# Patient Record
Sex: FEMALE | Race: WHITE | NOT HISPANIC OR LATINO | Employment: OTHER | URBAN - METROPOLITAN AREA
[De-identification: names, ages, dates, MRNs, and addresses within clinical notes are randomized per-mention and may not be internally consistent; named-entity substitution may affect disease eponyms.]

---

## 2017-06-09 ENCOUNTER — OFFICE VISIT (OUTPATIENT)
Dept: TRANSPLANT | Facility: CLINIC | Age: 82
End: 2017-06-09
Payer: MEDICARE

## 2017-06-09 ENCOUNTER — LAB VISIT (OUTPATIENT)
Dept: LAB | Facility: HOSPITAL | Age: 82
End: 2017-06-09
Attending: INTERNAL MEDICINE
Payer: MEDICARE

## 2017-06-09 VITALS
SYSTOLIC BLOOD PRESSURE: 117 MMHG | HEART RATE: 58 BPM | WEIGHT: 232.81 LBS | HEIGHT: 62 IN | BODY MASS INDEX: 42.84 KG/M2 | DIASTOLIC BLOOD PRESSURE: 50 MMHG

## 2017-06-09 DIAGNOSIS — I10 ESSENTIAL HYPERTENSION: ICD-10-CM

## 2017-06-09 DIAGNOSIS — G47.33 OSA ON CPAP: ICD-10-CM

## 2017-06-09 DIAGNOSIS — I50.32 CHRONIC DIASTOLIC CONGESTIVE HEART FAILURE: ICD-10-CM

## 2017-06-09 DIAGNOSIS — I27.20 PULMONARY HYPERTENSION: ICD-10-CM

## 2017-06-09 DIAGNOSIS — E78.2 MIXED HYPERLIPIDEMIA: ICD-10-CM

## 2017-06-09 DIAGNOSIS — I27.20 PULMONARY HYPERTENSION: Primary | ICD-10-CM

## 2017-06-09 LAB
ALBUMIN SERPL BCP-MCNC: 3.4 G/DL
ALP SERPL-CCNC: 68 U/L
ALT SERPL W/O P-5'-P-CCNC: 13 U/L
ANION GAP SERPL CALC-SCNC: 9 MMOL/L
AST SERPL-CCNC: 22 U/L
BILIRUB SERPL-MCNC: 0.8 MG/DL
BNP SERPL-MCNC: 68 PG/ML
BUN SERPL-MCNC: 19 MG/DL
CALCIUM SERPL-MCNC: 9.1 MG/DL
CHLORIDE SERPL-SCNC: 94 MMOL/L
CO2 SERPL-SCNC: 29 MMOL/L
CREAT SERPL-MCNC: 0.8 MG/DL
EST. GFR  (AFRICAN AMERICAN): >60 ML/MIN/1.73 M^2
EST. GFR  (NON AFRICAN AMERICAN): >60 ML/MIN/1.73 M^2
GLUCOSE SERPL-MCNC: 87 MG/DL
POTASSIUM SERPL-SCNC: 5 MMOL/L
PROT SERPL-MCNC: 7.1 G/DL
SODIUM SERPL-SCNC: 132 MMOL/L

## 2017-06-09 PROCEDURE — 99214 OFFICE O/P EST MOD 30 MIN: CPT | Mod: S$GLB,,, | Performed by: INTERNAL MEDICINE

## 2017-06-09 PROCEDURE — 99999 PR PBB SHADOW E&M-EST. PATIENT-LVL III: CPT | Mod: PBBFAC,,, | Performed by: INTERNAL MEDICINE

## 2017-06-09 PROCEDURE — 1159F MED LIST DOCD IN RCRD: CPT | Mod: S$GLB,,, | Performed by: INTERNAL MEDICINE

## 2017-06-09 PROCEDURE — 1125F AMNT PAIN NOTED PAIN PRSNT: CPT | Mod: S$GLB,,, | Performed by: INTERNAL MEDICINE

## 2017-06-09 RX ORDER — SPIRONOLACTONE 25 MG/1
25 TABLET ORAL DAILY
Qty: 90 TABLET | Refills: 3 | Status: SHIPPED | OUTPATIENT
Start: 2017-06-09 | End: 2018-06-29 | Stop reason: SDUPTHER

## 2017-06-09 RX ORDER — LISINOPRIL 10 MG/1
10 TABLET ORAL DAILY
Qty: 90 TABLET | Refills: 3 | Status: SHIPPED | OUTPATIENT
Start: 2017-06-09 | End: 2018-06-17 | Stop reason: SDUPTHER

## 2017-06-09 RX ORDER — TORSEMIDE 10 MG/1
10 TABLET ORAL DAILY
Qty: 90 TABLET | Refills: 3 | Status: SHIPPED | OUTPATIENT
Start: 2017-06-09 | End: 2018-06-29 | Stop reason: SDUPTHER

## 2017-06-09 RX ORDER — TRIAMCINOLONE ACETONIDE 1 MG/G
CREAM TOPICAL
COMMUNITY
Start: 2017-05-13 | End: 2017-09-11

## 2017-06-09 NOTE — PROGRESS NOTES
"Subjective:     HPI:  Very pleasant 80 y/o female with Hx of HTN, diastolic dysfunction, obesity and KHANH (not using CPAP) who is referred for evaluation and management of pulmonary HTN. She is accompanied by her daughter. Clinically reports NYHA/WHO  Class III symptoms. Most recent echo done earlier  this year showed preserved LV function with normal LV size, mild concentric hypertrophy, PASP of 75 mm of Hg. RHC done here at [facility] showed moderate to sever PH with high elevated PCWP consistent with group 2 PH. Labs are WNL.     RHC done in 11/2016  AOPRES: 152/70 (97)  FICKCI: 2.18  FICKCO: 4.43  PAPRES: 66/11 (36)  PVR: 4.06  PWPRES: 26/20 (26)  RAPRES: 14/13 (14)  RVPRES: 56/0, 12    Past Medical History:   Diagnosis Date    DVT (deep venous thrombosis)     Edema     Hyperlipidemia     Hypertension     KHANH (obstructive sleep apnea)      No past surgical history on file.  OB History     No data available        Review of Systems   Constitution: Negative. Negative for chills, decreased appetite, diaphoresis, fever, weakness, malaise/fatigue, night sweats, weight gain and weight loss.   Eyes: Negative.    Cardiovascular: Positive for dyspnea on exertion and orthopnea. Negative for chest pain, claudication, cyanosis, irregular heartbeat, leg swelling, near-syncope, palpitations, paroxysmal nocturnal dyspnea and syncope.   Respiratory: Negative for cough, hemoptysis and shortness of breath.    Endocrine: Negative.    Hematologic/Lymphatic: Negative.    Skin: Negative for color change, dry skin and nail changes.   Musculoskeletal: Negative.    Gastrointestinal: Negative.    Genitourinary: Negative.        Objective:   Blood pressure (!) 117/50, pulse (!) 58, height 5' 2" (1.575 m), weight 105.6 kg (232 lb 12.9 oz).body mass index is 42.58 kg/m².  Physical Exam   Constitutional: She is oriented to person, place, and time. Vital signs are normal. She appears well-developed and well-nourished.   HENT:   Head: " Normocephalic.   Eyes: Pupils are equal, round, and reactive to light.   Neck: Neck supple. No JVD present.   Cardiovascular: Normal rate, regular rhythm, normal heart sounds and intact distal pulses.  PMI is not displaced.  Exam reveals no gallop and no friction rub.    No murmur heard.      Pulmonary/Chest: Effort normal and breath sounds normal. She has no wheezes. She has no rales.   Abdominal: Soft. Bowel sounds are normal. She exhibits no distension. There is no tenderness. There is no rebound.   Musculoskeletal: She exhibits no edema.   Neurological: She is alert and oriented to person, place, and time.   Nursing note and vitals reviewed.      Labs:    Chemistry        Component Value Date/Time     12/12/2016 0750    K 4.3 12/12/2016 0750     12/12/2016 0750    CO2 29 12/12/2016 0750    BUN 22 12/12/2016 0750    CREATININE 0.7 12/12/2016 0750    GLU 92 12/12/2016 0750        Component Value Date/Time    CALCIUM 9.1 12/12/2016 0750    ALKPHOS 85 11/15/2016 0935    AST 24 11/15/2016 0935    ALT 12 11/15/2016 0935    BILITOT 0.7 11/15/2016 0935          Magnesium   Date Value Ref Range Status   11/04/2016 1.8 1.6 - 2.6 mg/dL Final     Lab Results   Component Value Date    WBC 5.78 11/15/2016    HGB 14.6 11/15/2016    HCT 45.5 11/15/2016     11/15/2016     Lab Results   Component Value Date    INR 1.0 11/08/2016     BNP   Date Value Ref Range Status   12/12/2016 75 0 - 99 pg/mL Final     Comment:     Values of less than 100 pg/ml are consistent with non-CHF populations.   11/04/2016 56 0 - 99 pg/mL Final     Comment:     Values of less than 100 pg/ml are consistent with non-CHF populations.     No results found for: LDH  No results found for this or any previous visit.  No results found for this or any previous visit.    Assessment:      1. Pulmonary hypertension    2. Essential hypertension    3. Chronic diastolic congestive heart failure    4. Mixed hyperlipidemia    5. KHANH on CPAP         Plan:   80 y/o female with above Hx and severe pulmonary HTN by 2D echo and RHC. G  roup 2 (HTN, diastolic dysfunction) and 3 (untreated KHANH and obesity hypoventilation syndrome) PH.   Increase Lisinopril to 10 mg daily  BMP next week  Start Torsemide 10 mg daily  She is currently taking Aldactone 25 mg BID. Am changing it to 25 mg daily.   Had a long discussion with the patient about the importance of using her CPAP and losing weight.  Recommend 2 gram sodium restriction and 1500cc fluid restriction.  Encourage physical activity with graded exercise program.  Requested patient to weigh themselves daily, and to notify us if their weight increases by more than 3 lbs in 1 day or 5 lbs in 1 week.    RTC in 3 months with labs      Rm Nguyễn MD

## 2017-09-11 ENCOUNTER — LAB VISIT (OUTPATIENT)
Dept: LAB | Facility: HOSPITAL | Age: 82
End: 2017-09-11
Attending: INTERNAL MEDICINE
Payer: MEDICARE

## 2017-09-11 ENCOUNTER — OFFICE VISIT (OUTPATIENT)
Dept: TRANSPLANT | Facility: CLINIC | Age: 82
End: 2017-09-11
Payer: MEDICARE

## 2017-09-11 VITALS
WEIGHT: 239 LBS | DIASTOLIC BLOOD PRESSURE: 48 MMHG | SYSTOLIC BLOOD PRESSURE: 132 MMHG | HEIGHT: 61 IN | HEART RATE: 57 BPM | BODY MASS INDEX: 45.12 KG/M2

## 2017-09-11 DIAGNOSIS — I50.32 CHRONIC DIASTOLIC CONGESTIVE HEART FAILURE: Primary | ICD-10-CM

## 2017-09-11 DIAGNOSIS — I27.20 PULMONARY HYPERTENSION: ICD-10-CM

## 2017-09-11 DIAGNOSIS — G47.33 OSA ON CPAP: ICD-10-CM

## 2017-09-11 DIAGNOSIS — E78.2 MIXED HYPERLIPIDEMIA: ICD-10-CM

## 2017-09-11 DIAGNOSIS — I10 ESSENTIAL HYPERTENSION: ICD-10-CM

## 2017-09-11 LAB
ALBUMIN SERPL BCP-MCNC: 3.1 G/DL
ALP SERPL-CCNC: 59 U/L
ALT SERPL W/O P-5'-P-CCNC: 8 U/L
ANION GAP SERPL CALC-SCNC: 7 MMOL/L
AST SERPL-CCNC: 16 U/L
BILIRUB SERPL-MCNC: 0.6 MG/DL
BNP SERPL-MCNC: 94 PG/ML
BUN SERPL-MCNC: 41 MG/DL
CALCIUM SERPL-MCNC: 9.3 MG/DL
CHLORIDE SERPL-SCNC: 101 MMOL/L
CO2 SERPL-SCNC: 30 MMOL/L
CREAT SERPL-MCNC: 1 MG/DL
EST. GFR  (AFRICAN AMERICAN): >60 ML/MIN/1.73 M^2
EST. GFR  (NON AFRICAN AMERICAN): 53.7 ML/MIN/1.73 M^2
GLUCOSE SERPL-MCNC: 90 MG/DL
POTASSIUM SERPL-SCNC: 4.5 MMOL/L
PROT SERPL-MCNC: 6.7 G/DL
SODIUM SERPL-SCNC: 138 MMOL/L

## 2017-09-11 PROCEDURE — 36415 COLL VENOUS BLD VENIPUNCTURE: CPT

## 2017-09-11 PROCEDURE — 99214 OFFICE O/P EST MOD 30 MIN: CPT | Mod: S$GLB,,, | Performed by: INTERNAL MEDICINE

## 2017-09-11 PROCEDURE — 1159F MED LIST DOCD IN RCRD: CPT | Mod: S$GLB,,, | Performed by: INTERNAL MEDICINE

## 2017-09-11 PROCEDURE — 3078F DIAST BP <80 MM HG: CPT | Mod: S$GLB,,, | Performed by: INTERNAL MEDICINE

## 2017-09-11 PROCEDURE — 99999 PR PBB SHADOW E&M-EST. PATIENT-LVL III: CPT | Mod: PBBFAC,,, | Performed by: INTERNAL MEDICINE

## 2017-09-11 PROCEDURE — 83880 ASSAY OF NATRIURETIC PEPTIDE: CPT

## 2017-09-11 PROCEDURE — 80053 COMPREHEN METABOLIC PANEL: CPT

## 2017-09-11 PROCEDURE — 3008F BODY MASS INDEX DOCD: CPT | Mod: S$GLB,,, | Performed by: INTERNAL MEDICINE

## 2017-09-11 PROCEDURE — 1126F AMNT PAIN NOTED NONE PRSNT: CPT | Mod: S$GLB,,, | Performed by: INTERNAL MEDICINE

## 2017-09-11 PROCEDURE — 3075F SYST BP GE 130 - 139MM HG: CPT | Mod: S$GLB,,, | Performed by: INTERNAL MEDICINE

## 2017-09-11 NOTE — PROGRESS NOTES
"Subjective:     HPI:  Very pleasant 82 y/o female with Hx of HTN, diastolic dysfunction, obesity and KHANH (not using CPAP) who is referred for evaluation and management of pulmonary HTN. She is accompanied by her daughter. Clinically reports NYHA/WHO  Class III symptoms. Most recent echo done earlier  this year showed preserved LV function with normal LV size, mild concentric hypertrophy, PASP of 75 mm of Hg. RHC done here at [facility] showed moderate to sever PH with high elevated PCWP consistent with group 2 PH. Labs are WNL.     Past Medical History:   Diagnosis Date    DVT (deep venous thrombosis)     Edema     Hyperlipidemia     Hypertension     KHANH (obstructive sleep apnea)      History reviewed. No pertinent surgical history.  OB History     No data available        Review of Systems   Constitution: Negative. Negative for chills, decreased appetite, diaphoresis, fever, weakness, malaise/fatigue, night sweats, weight gain and weight loss.   Eyes: Negative.    Cardiovascular: Positive for dyspnea on exertion and leg swelling. Negative for chest pain, claudication, cyanosis, irregular heartbeat, near-syncope, orthopnea, palpitations, paroxysmal nocturnal dyspnea and syncope.   Respiratory: Negative for cough, hemoptysis and shortness of breath.    Endocrine: Negative.    Hematologic/Lymphatic: Negative.    Skin: Negative for color change, dry skin and nail changes.   Musculoskeletal: Negative.    Gastrointestinal: Negative.    Genitourinary: Negative.        Objective:   Blood pressure (!) 132/48, pulse (!) 57, height 5' 1" (1.549 m), weight 108.4 kg (238 lb 15.7 oz).body mass index is 45.15 kg/m².  Physical Exam   Constitutional: She is oriented to person, place, and time. Vital signs are normal. She appears well-developed and well-nourished.   HENT:   Head: Normocephalic.   Eyes: Pupils are equal, round, and reactive to light.   Neck: Neck supple. No JVD present.   Cardiovascular: Normal rate, regular rhythm " and intact distal pulses.  PMI is not displaced.  Exam reveals no gallop and no friction rub.    Murmur heard.   Systolic murmur is present with a grade of 2/6  at the upper left sternal border  Pulmonary/Chest: Effort normal and breath sounds normal. No respiratory distress. She has no wheezes. She has no rales.   Abdominal: Soft. Bowel sounds are normal. She exhibits no distension. There is no tenderness. There is no rebound.   Musculoskeletal: She exhibits edema.   Neurological: She is alert and oriented to person, place, and time.   Nursing note and vitals reviewed.      Labs:    Chemistry        Component Value Date/Time     09/11/2017 0728    K 4.5 09/11/2017 0728     09/11/2017 0728    CO2 30 (H) 09/11/2017 0728    BUN 41 (H) 09/11/2017 0728    CREATININE 1.0 09/11/2017 0728    GLU 90 09/11/2017 0728        Component Value Date/Time    CALCIUM 9.3 09/11/2017 0728    ALKPHOS 59 09/11/2017 0728    AST 16 09/11/2017 0728    ALT 8 (L) 09/11/2017 0728    BILITOT 0.6 09/11/2017 0728    ESTGFRAFRICA >60.0 09/11/2017 0728    EGFRNONAA 53.7 (A) 09/11/2017 0728          Magnesium   Date Value Ref Range Status   11/04/2016 1.8 1.6 - 2.6 mg/dL Final     Lab Results   Component Value Date    WBC 5.78 11/15/2016    HGB 14.6 11/15/2016    HCT 45.5 11/15/2016     11/15/2016     Lab Results   Component Value Date    INR 1.0 11/08/2016     BNP   Date Value Ref Range Status   06/09/2017 68 0 - 99 pg/mL Final     Comment:     Values of less than 100 pg/ml are consistent with non-CHF populations.   12/12/2016 75 0 - 99 pg/mL Final     Comment:     Values of less than 100 pg/ml are consistent with non-CHF populations.   11/04/2016 56 0 - 99 pg/mL Final     Comment:     Values of less than 100 pg/ml are consistent with non-CHF populations.     No results found for: LDH  No results found for this or any previous visit.  No results found for this or any previous visit.    Assessment:      1. Chronic diastolic  congestive heart failure    2. Pulmonary hypertension    3. Essential hypertension    4. Mixed hyperlipidemia    5. KHANH on CPAP        Plan:   82 y/o female with above Hx and severe pulmonary HTN by 2D echo and RHC. Mixed picture of  WHO Group 2 (HTN, diastolic dysfunction) and 3 (untreated KHANH and obesity hypoventilation syndrome) PH.   Continue current Tx  Had a long discussion with the patient about the importance of using her CPAP and losing weight. Still does not want to use it (states it's too noisy).  Recommend 2 gram sodium restriction and 1500cc fluid restriction.  Encourage physical activity with graded exercise program.  Requested patient to weigh themselves daily, and to notify us if their weight increases by more than 3 lbs in 1 day or 5 lbs in 1 week.    RTC in 6 months with labs      Rm Nguyễn MD

## 2017-11-10 ENCOUNTER — OFFICE VISIT (OUTPATIENT)
Dept: URGENT CARE | Facility: CLINIC | Age: 82
End: 2017-11-10
Payer: MEDICARE

## 2017-11-10 VITALS
OXYGEN SATURATION: 95 % | HEART RATE: 69 BPM | DIASTOLIC BLOOD PRESSURE: 63 MMHG | WEIGHT: 238 LBS | BODY MASS INDEX: 44.93 KG/M2 | SYSTOLIC BLOOD PRESSURE: 137 MMHG | TEMPERATURE: 98 F | RESPIRATION RATE: 18 BRPM | HEIGHT: 61 IN

## 2017-11-10 DIAGNOSIS — J40 BRONCHITIS: Primary | ICD-10-CM

## 2017-11-10 PROCEDURE — 99214 OFFICE O/P EST MOD 30 MIN: CPT | Mod: S$GLB,,, | Performed by: NURSE PRACTITIONER

## 2017-11-10 RX ORDER — FLUTICASONE PROPIONATE 50 MCG
1 SPRAY, SUSPENSION (ML) NASAL 2 TIMES DAILY
Qty: 1 BOTTLE | Refills: 0 | Status: SHIPPED | OUTPATIENT
Start: 2017-11-10 | End: 2018-09-18

## 2017-11-10 RX ORDER — AZITHROMYCIN 250 MG/1
TABLET, FILM COATED ORAL
Qty: 6 TABLET | Refills: 0 | Status: SHIPPED | OUTPATIENT
Start: 2017-11-10 | End: 2017-11-15

## 2017-11-10 NOTE — PROGRESS NOTES
"Subjective:       Patient ID: Lily Hernandez is a 82 y.o. female.    Vitals:  height is 5' 1" (1.549 m) and weight is 108 kg (238 lb). Her temperature is 97.9 °F (36.6 °C). Her blood pressure is 137/63 and her pulse is 69. Her respiration is 18 and oxygen saturation is 95%.     Chief Complaint: Cough    Pt states she has a cough for about a week now, pt has wheezing and shortness of breath.  She has a chronic history of SOB associated with chronic cardiac and pulmonary.  She has not taken anything for current symptoms.  She denies known sick contacts.      Cough   This is a new problem. The current episode started in the past 7 days. The problem has been unchanged. The problem occurs every few minutes. The cough is productive of sputum. Associated symptoms include shortness of breath and wheezing. Pertinent negatives include no chest pain, chills, ear pain, eye redness, fever, headaches, myalgias or sore throat. Nothing aggravates the symptoms. She has tried OTC cough suppressant for the symptoms. The treatment provided no relief.     Review of Systems   Constitution: Negative for chills, fever and malaise/fatigue.   HENT: Negative for congestion, ear pain, hoarse voice and sore throat.    Eyes: Negative for discharge and redness.   Cardiovascular: Negative for chest pain, dyspnea on exertion and leg swelling.   Respiratory: Positive for cough, shortness of breath, sputum production and wheezing.    Musculoskeletal: Negative for myalgias.   Gastrointestinal: Negative for abdominal pain and nausea.   Neurological: Negative for headaches.       Objective:      Physical Exam   Constitutional: She is oriented to person, place, and time. She appears well-developed and well-nourished. She is cooperative.  Non-toxic appearance. She does not appear ill. No distress.   HENT:   Head: Normocephalic and atraumatic.   Right Ear: Hearing, tympanic membrane, external ear and ear canal normal.   Left Ear: Hearing, tympanic membrane, " external ear and ear canal normal.   Nose: Nose normal. No mucosal edema, rhinorrhea or nasal deformity. No epistaxis. Right sinus exhibits no maxillary sinus tenderness and no frontal sinus tenderness. Left sinus exhibits no maxillary sinus tenderness and no frontal sinus tenderness.   Mouth/Throat: Uvula is midline and mucous membranes are normal. No trismus in the jaw. Normal dentition. No uvula swelling. Posterior oropharyngeal erythema present.       Eyes: Conjunctivae and lids are normal. No scleral icterus.   Sclera clear bilat   Neck: Trachea normal, full passive range of motion without pain and phonation normal. Neck supple.   Cardiovascular: Normal rate, regular rhythm, intact distal pulses and normal pulses.    Murmur heard.   Systolic murmur is present with a grade of 3/6   Pulmonary/Chest: Effort normal and breath sounds normal. No respiratory distress. She has no decreased breath sounds. She has no wheezes. She has no rhonchi. She has no rales.   Abdominal: Soft. Normal appearance and bowel sounds are normal. She exhibits no distension. There is no tenderness.   Musculoskeletal: Normal range of motion. She exhibits no edema or deformity.   Neurological: She is alert and oriented to person, place, and time. She exhibits normal muscle tone. Coordination normal.   Skin: Skin is warm, dry and intact. She is not diaphoretic. No pallor.   Psychiatric: She has a normal mood and affect. Her speech is normal and behavior is normal. Judgment and thought content normal. Cognition and memory are normal.   Nursing note and vitals reviewed.      Assessment:       1. Bronchitis        Plan:         Bronchitis  -     azithromycin (Z-DEANN) 250 MG tablet; Take 2 tablets by mouth on day 1; Take 1 tablet by mouth on days 2-5  Dispense: 6 tablet; Refill: 0  -     fluticasone (FLONASE) 50 mcg/actuation nasal spray; 1 spray by Each Nare route 2 (two) times daily.  Dispense: 1 Bottle; Refill: 0      Patient Instructions        What Is Acute Bronchitis?  Acute bronchitis is when the airways in your lungs (bronchial tubes) become red and swollen (inflamed). It is usually caused by a viral infection. But it can also occur because of a bacteria or allergen. Symptoms include a cough that produces yellow or greenish mucus and can last for days or sometimes weeks.  Inside healthy lungs    Air travels in and out of the lungs through the airways. The linings of these airways produce sticky mucus. This mucus traps particles that enter the lungs. Tiny structures called cilia then sweep the particles out of the airways.     Healthy airway: Airways are normally open. Air moves in and out easily.      Healthy cilia: Tiny, hairlike cilia sweep mucus and particles up and out of the airways.   Lungs with bronchitis  Bronchitis often occurs with a cold or the flu virus. The airways become inflamed (red and swollen). There is a deep hacking cough from the extra mucus. Other symptoms may include:  · Wheezing  · Chest discomfort  · Shortness of breath  · Mild fever  A second infection, this time due to bacteria, may then occur. And airways irritated by allergens or smoke are more likely to get infected.        Inflamed airway: Inflammation and extra mucus narrow the airway, causing shortness of breath.      Impaired cilia: Extra mucus impairs cilia, causing congestion and wheezing. Smoking makes the problem worse.   Making a diagnosis  A physical exam, health history, and certain tests help your healthcare provider make the diagnosis.  Health history  Your healthcare provider will ask you about your symptoms.  The exam  Your provider listens to your chest for signs of congestion. He or she may also check your ears, nose, and throat.  Possible tests  · A sputum test for bacteria. This requires a sample of mucus from your lungs.  · A nasal or throat swab. This tests to see if you have a bacterial infection.  · A chest X-ray. This is done if your  healthcare provider thinks you have pneumonia.  · Tests to check for an underlying condition. Other tests may be done to check for things such as allergies, asthma, or COPD (chronic obstructive pulmonary disease). You may need to see a specialist for more lung function testing.  Treating a cough  The main treatment for bronchitis is easing symptoms. Avoiding smoke, allergens, and other things that trigger coughing can often help. If the infection is bacterial, you may be given antibiotics. During the illness, it's important to get plenty of sleep. To ease symptoms:  · Dont smoke. Also avoid secondhand smoke.  · Use a humidifier. Or try breathing in steam from a hot shower. This may help loosen mucus.  · Drink a lot of water and juice. They can soothe the throat and may help thin mucus.  · Sit up or use extra pillows when in bed. This helps to lessen coughing and congestion.  · Ask your provider about using medicine. Ask about using cough medicine, pain and fever medicine, or a decongestant.  Antibiotics  Most cases of bronchitis are caused by cold or flu viruses. They dont need antibiotics to treat them, even if your mucus is thick and green or yellow. Antibiotics dont treat viral illness and antibiotics have not been shown to have any benefit in cases of acute bronchitis. Taking antibiotics when they are not needed increases your risk of getting an infection later that is antibiotic-resistant. Antibiotics can also cause severe cases of diarrhea that require other antibiotics to treat.  It is important that you accept your healthcare provider's opinion to not use antibiotics. Your provider will prescribe antibiotics if the infection is caused by bacteria. If they are prescribed:  · Take all of the medicine. Take the medicine until it is used up, even if symptoms have improved. If you dont, the bronchitis may come back.  · Take the medicines as directed. For instance, some medicines should be taken with  food.  · Ask about side effects. Ask your provider or pharmacist what side effects are common, and what to do about them.  Follow-up care  You should see your provider again in 2 to 3 weeks. By this time, symptoms should have improved. An infection that lasts longer may mean you have a more serious problem.  Prevention  · Avoid tobacco smoke. If you smoke, quit. Stay away from smoky places. Ask friends and family not to smoke around you, or in your home or car.  · Get checked for allergies.  · Ask your provider about getting a yearly flu shot. Also ask about pneumococcal or pneumonia shots.  · Wash your hands often. This helps reduce the chance of picking up viruses that cause colds and flu.  Call your healthcare provider if:  · Symptoms worsen, or you have new symptoms  · Breathing problems worsen or  become severe  · Symptoms dont get better within a week, or within 3 days of taking antibiotics   Date Last Reviewed: 2/1/2017  © 8855-2848 [employer]. [address] [place] [address], [place] [address]. All rights reserved. This information is not intended as a substitute for professional medical care. Always follow your healthcare professional's instructions.

## 2017-11-11 NOTE — PATIENT INSTRUCTIONS
What Is Acute Bronchitis?  Acute bronchitis is when the airways in your lungs (bronchial tubes) become red and swollen (inflamed). It is usually caused by a viral infection. But it can also occur because of a bacteria or allergen. Symptoms include a cough that produces yellow or greenish mucus and can last for days or sometimes weeks.  Inside healthy lungs    Air travels in and out of the lungs through the airways. The linings of these airways produce sticky mucus. This mucus traps particles that enter the lungs. Tiny structures called cilia then sweep the particles out of the airways.     Healthy airway: Airways are normally open. Air moves in and out easily.      Healthy cilia: Tiny, hairlike cilia sweep mucus and particles up and out of the airways.   Lungs with bronchitis  Bronchitis often occurs with a cold or the flu virus. The airways become inflamed (red and swollen). There is a deep hacking cough from the extra mucus. Other symptoms may include:  · Wheezing  · Chest discomfort  · Shortness of breath  · Mild fever  A second infection, this time due to bacteria, may then occur. And airways irritated by allergens or smoke are more likely to get infected.        Inflamed airway: Inflammation and extra mucus narrow the airway, causing shortness of breath.      Impaired cilia: Extra mucus impairs cilia, causing congestion and wheezing. Smoking makes the problem worse.   Making a diagnosis  A physical exam, health history, and certain tests help your healthcare provider make the diagnosis.  Health history  Your healthcare provider will ask you about your symptoms.  The exam  Your provider listens to your chest for signs of congestion. He or she may also check your ears, nose, and throat.  Possible tests  · A sputum test for bacteria. This requires a sample of mucus from your lungs.  · A nasal or throat swab. This tests to see if you have a bacterial infection.  · A chest X-ray. This is done if your healthcare  provider thinks you have pneumonia.  · Tests to check for an underlying condition. Other tests may be done to check for things such as allergies, asthma, or COPD (chronic obstructive pulmonary disease). You may need to see a specialist for more lung function testing.  Treating a cough  The main treatment for bronchitis is easing symptoms. Avoiding smoke, allergens, and other things that trigger coughing can often help. If the infection is bacterial, you may be given antibiotics. During the illness, it's important to get plenty of sleep. To ease symptoms:  · Dont smoke. Also avoid secondhand smoke.  · Use a humidifier. Or try breathing in steam from a hot shower. This may help loosen mucus.  · Drink a lot of water and juice. They can soothe the throat and may help thin mucus.  · Sit up or use extra pillows when in bed. This helps to lessen coughing and congestion.  · Ask your provider about using medicine. Ask about using cough medicine, pain and fever medicine, or a decongestant.  Antibiotics  Most cases of bronchitis are caused by cold or flu viruses. They dont need antibiotics to treat them, even if your mucus is thick and green or yellow. Antibiotics dont treat viral illness and antibiotics have not been shown to have any benefit in cases of acute bronchitis. Taking antibiotics when they are not needed increases your risk of getting an infection later that is antibiotic-resistant. Antibiotics can also cause severe cases of diarrhea that require other antibiotics to treat.  It is important that you accept your healthcare provider's opinion to not use antibiotics. Your provider will prescribe antibiotics if the infection is caused by bacteria. If they are prescribed:  · Take all of the medicine. Take the medicine until it is used up, even if symptoms have improved. If you dont, the bronchitis may come back.  · Take the medicines as directed. For instance, some medicines should be taken with food.  · Ask about  side effects. Ask your provider or pharmacist what side effects are common, and what to do about them.  Follow-up care  You should see your provider again in 2 to 3 weeks. By this time, symptoms should have improved. An infection that lasts longer may mean you have a more serious problem.  Prevention  · Avoid tobacco smoke. If you smoke, quit. Stay away from smoky places. Ask friends and family not to smoke around you, or in your home or car.  · Get checked for allergies.  · Ask your provider about getting a yearly flu shot. Also ask about pneumococcal or pneumonia shots.  · Wash your hands often. This helps reduce the chance of picking up viruses that cause colds and flu.  Call your healthcare provider if:  · Symptoms worsen, or you have new symptoms  · Breathing problems worsen or  become severe  · Symptoms dont get better within a week, or within 3 days of taking antibiotics   Date Last Reviewed: 2/1/2017  © 0197-9705 [employer]. [address] [place] [address], [place] [address]. All rights reserved. This information is not intended as a substitute for professional medical care. Always follow your healthcare professional's instructions.

## 2018-03-06 ENCOUNTER — OFFICE VISIT (OUTPATIENT)
Dept: CARDIOLOGY | Facility: CLINIC | Age: 83
End: 2018-03-06
Payer: MEDICARE

## 2018-03-06 VITALS
SYSTOLIC BLOOD PRESSURE: 112 MMHG | HEART RATE: 67 BPM | DIASTOLIC BLOOD PRESSURE: 44 MMHG | WEIGHT: 239.38 LBS | HEIGHT: 61 IN | BODY MASS INDEX: 45.19 KG/M2

## 2018-03-06 DIAGNOSIS — I50.32 CHRONIC DIASTOLIC CONGESTIVE HEART FAILURE: ICD-10-CM

## 2018-03-06 DIAGNOSIS — I10 ESSENTIAL HYPERTENSION: ICD-10-CM

## 2018-03-06 DIAGNOSIS — E78.2 MIXED HYPERLIPIDEMIA: ICD-10-CM

## 2018-03-06 DIAGNOSIS — I25.10 CORONARY ARTERY DISEASE INVOLVING NATIVE CORONARY ARTERY OF NATIVE HEART WITHOUT ANGINA PECTORIS: Primary | ICD-10-CM

## 2018-03-06 DIAGNOSIS — I87.2 VENOUS STASIS DERMATITIS OF BOTH LOWER EXTREMITIES: ICD-10-CM

## 2018-03-06 DIAGNOSIS — I10 ESSENTIAL HYPERTENSION, BENIGN: ICD-10-CM

## 2018-03-06 DIAGNOSIS — I27.20 PULMONARY HYPERTENSION: ICD-10-CM

## 2018-03-06 DIAGNOSIS — I27.9 CHRONIC PULMONARY HEART DISEASE: ICD-10-CM

## 2018-03-06 DIAGNOSIS — I35.0 NONRHEUMATIC AORTIC VALVE STENOSIS: ICD-10-CM

## 2018-03-06 PROCEDURE — 3078F DIAST BP <80 MM HG: CPT | Mod: S$GLB,,, | Performed by: INTERNAL MEDICINE

## 2018-03-06 PROCEDURE — 99214 OFFICE O/P EST MOD 30 MIN: CPT | Mod: S$GLB,,, | Performed by: INTERNAL MEDICINE

## 2018-03-06 PROCEDURE — 93000 ELECTROCARDIOGRAM COMPLETE: CPT | Mod: S$GLB,,, | Performed by: INTERNAL MEDICINE

## 2018-03-06 PROCEDURE — 3074F SYST BP LT 130 MM HG: CPT | Mod: S$GLB,,, | Performed by: INTERNAL MEDICINE

## 2018-03-06 PROCEDURE — 99999 PR PBB SHADOW E&M-EST. PATIENT-LVL III: CPT | Mod: PBBFAC,,, | Performed by: INTERNAL MEDICINE

## 2018-03-06 NOTE — PROGRESS NOTES
Subjective:      Patient ID: Lily Hernandez is a 82 y.o. female.    Chief Complaint: Shortness of Breath    HPI:  Pt has severe, chronic shortness of breath walking a few feet, getting slightly worse.     Review of Systems   Cardiovascular: Positive for chest pain (rare left axillary pain lasts a couple of seconds, occurs with activity) and dyspnea on exertion. Negative for claudication, irregular heartbeat, leg swelling, near-syncope, orthopnea, palpitations and syncope.      Got a new mouthpiece for CPAP.  Sees Dr Alatorre.     Past Medical History:   Diagnosis Date    DVT (deep venous thrombosis)     Edema     Hyperlipidemia     Hypertension     KHANH (obstructive sleep apnea)         No past surgical history on file.    No family history on file.    Social History     Social History    Marital status:      Spouse name: N/A    Number of children: N/A    Years of education: N/A     Social History Main Topics    Smoking status: Never Smoker    Smokeless tobacco: Never Used    Alcohol use None    Drug use: Unknown    Sexual activity: Not Asked     Other Topics Concern    None     Social History Narrative    None       Current Outpatient Prescriptions on File Prior to Visit   Medication Sig Dispense Refill    acetaminophen (TYLENOL) 500 MG tablet Take 500 mg by mouth every 6 (six) hours as needed for Pain.      aspirin (ECOTRIN) 81 MG EC tablet Take 81 mg by mouth once daily.      azelastine (ASTELIN) 137 mcg (0.1 %) nasal spray       calcium carbonate (OS-SAMSON) 600 mg (1,500 mg) Tab Take 600 mg by mouth 2 (two) times daily with meals.      cholecalciferol, vitamin D3, 5,000 unit Tab Take 5,000 Units by mouth once daily.      fluticasone (FLONASE) 50 mcg/actuation nasal spray 1 spray by Each Nare route 2 (two) times daily. 1 Bottle 0    lisinopril 10 MG tablet Take 1 tablet (10 mg total) by mouth once daily. 90 tablet 3    lovastatin (MEVACOR) 40 MG tablet 40 mg nightly.   0    metoprolol  "succinate (TOPROL-XL) 50 MG 24 hr tablet TAKE 1 TABLET BY MOUTH EVERY DAY 90 tablet 3    nitroGLYCERIN (NITROSTAT) 0.4 MG SL tablet Place 1 tablet (0.4 mg total) under the tongue every 5 (five) minutes as needed for Chest pain. 25 tablet 12    spironolactone (ALDACTONE) 25 MG tablet Take 1 tablet (25 mg total) by mouth once daily. 90 tablet 3    torsemide (DEMADEX) 10 MG Tab Take 1 tablet (10 mg total) by mouth once daily. 90 tablet 3    [DISCONTINUED] ibandronate (BONIVA) 150 mg tablet 150 mg every 30 days.   1     No current facility-administered medications on file prior to visit.        Review of patient's allergies indicates:   Allergen Reactions    Endocaine     Noroxin [norfloxacin]     Tramadol     Tussin ac      Objective:     Vitals:    03/06/18 0830   BP: (!) 112/44   BP Location: Left arm   Patient Position: Sitting   BP Method: Medium (Automatic)   Pulse: 67   Weight: 108.6 kg (239 lb 6.4 oz)   Height: 5' 1" (1.549 m)        Physical Exam   Constitutional: She is oriented to person, place, and time. She appears well-developed and well-nourished.   Eyes: No scleral icterus.   Neck: No JVD present. Carotid bruit is not present.   Cardiovascular: Normal rate and regular rhythm.  Exam reveals no gallop (III/VI systolic).    Murmur heard.  Pulmonary/Chest: Breath sounds normal.   Musculoskeletal: She exhibits edema (trace).   Neurological: She is alert and oriented to person, place, and time.   Skin: Skin is warm and dry.   Psychiatric: She has a normal mood and affect. Her behavior is normal. Judgment and thought content normal.   Vitals reviewed.     Wt unchanged. Pt is obese    ECG: NSR, WNL  Assessment:     1. Coronary artery disease involving native coronary artery of native heart without angina pectoris    2. Mixed hyperlipidemia    3. Essential hypertension    4. Chronic pulmonary heart disease    5. Pulmonary hypertension    6. Venous stasis dermatitis of both lower extremities    7. " Nonrheumatic aortic valve stenosis    8. Chronic diastolic congestive heart failure    9. Essential hypertension, benign      Plan:   Lily was seen today for shortness of breath.    Diagnoses and all orders for this visit:    Coronary artery disease involving native coronary artery of native heart without angina pectoris  -     IN OFFICE EKG 12-LEAD (to Muse)    Mixed hyperlipidemia    Essential hypertension    Chronic pulmonary heart disease    Pulmonary hypertension    Venous stasis dermatitis of both lower extremities    Nonrheumatic aortic valve stenosis    Chronic diastolic congestive heart failure    Essential hypertension, benign  -     IN OFFICE EKG 12-LEAD (to Muse)    Shortness of breath due to obesity, deconditioning, elevated right hemidiaphragm, mild CAD, mild aortic stenosis, mild mitral regurgitation, and diastolic congestive heart failure as well as pulmonary hypertension.     Wt watcher's diet.  Ambulate in house daily  Consider pulmonary rehab  Try CPAP   Continue diuretics  Salt and fluid restriction.  Same meds    No Follow-up on file.

## 2018-03-06 NOTE — PROGRESS NOTES
Patient, Lily Hernandez (MRN #[id number]), presented with a recorded BMI of 45.23 kg/m^2 consistent with the definition of morbid obesity (ICD-10 E66.01). The patient's morbid obesity was monitored, evaluated, addressed and/or treated. This addendum to the medical record is made on 03/06/2018.

## 2018-03-14 ENCOUNTER — LAB VISIT (OUTPATIENT)
Dept: LAB | Facility: HOSPITAL | Age: 83
End: 2018-03-14
Attending: INTERNAL MEDICINE
Payer: MEDICARE

## 2018-03-14 ENCOUNTER — OFFICE VISIT (OUTPATIENT)
Dept: TRANSPLANT | Facility: CLINIC | Age: 83
End: 2018-03-14
Payer: MEDICARE

## 2018-03-14 VITALS
HEART RATE: 65 BPM | SYSTOLIC BLOOD PRESSURE: 121 MMHG | WEIGHT: 240.75 LBS | DIASTOLIC BLOOD PRESSURE: 49 MMHG | HEIGHT: 61 IN | BODY MASS INDEX: 45.45 KG/M2

## 2018-03-14 DIAGNOSIS — I50.32 CHRONIC DIASTOLIC CONGESTIVE HEART FAILURE: ICD-10-CM

## 2018-03-14 DIAGNOSIS — I10 ESSENTIAL HYPERTENSION: ICD-10-CM

## 2018-03-14 DIAGNOSIS — I25.10 CORONARY ARTERY DISEASE INVOLVING NATIVE CORONARY ARTERY OF NATIVE HEART WITHOUT ANGINA PECTORIS: ICD-10-CM

## 2018-03-14 DIAGNOSIS — I50.32 CHRONIC DIASTOLIC CONGESTIVE HEART FAILURE: Primary | ICD-10-CM

## 2018-03-14 DIAGNOSIS — I27.20 PULMONARY HYPERTENSION: ICD-10-CM

## 2018-03-14 LAB
ALBUMIN SERPL BCP-MCNC: 3.7 G/DL
ALP SERPL-CCNC: 73 U/L
ALT SERPL W/O P-5'-P-CCNC: 5 U/L
ANION GAP SERPL CALC-SCNC: 9 MMOL/L
ANION GAP SERPL CALC-SCNC: 9 MMOL/L
AST SERPL-CCNC: 16 U/L
BILIRUB SERPL-MCNC: 0.7 MG/DL
BNP SERPL-MCNC: 204 PG/ML
BUN SERPL-MCNC: 42 MG/DL
BUN SERPL-MCNC: 42 MG/DL
CALCIUM SERPL-MCNC: 9.8 MG/DL
CALCIUM SERPL-MCNC: 9.8 MG/DL
CHLORIDE SERPL-SCNC: 99 MMOL/L
CHLORIDE SERPL-SCNC: 99 MMOL/L
CO2 SERPL-SCNC: 29 MMOL/L
CO2 SERPL-SCNC: 29 MMOL/L
CREAT SERPL-MCNC: 1 MG/DL
CREAT SERPL-MCNC: 1 MG/DL
EST. GFR  (AFRICAN AMERICAN): >60 ML/MIN/1.73 M^2
EST. GFR  (AFRICAN AMERICAN): >60 ML/MIN/1.73 M^2
EST. GFR  (NON AFRICAN AMERICAN): 53.3 ML/MIN/1.73 M^2
EST. GFR  (NON AFRICAN AMERICAN): 53.3 ML/MIN/1.73 M^2
GLUCOSE SERPL-MCNC: 88 MG/DL
GLUCOSE SERPL-MCNC: 88 MG/DL
POTASSIUM SERPL-SCNC: 5 MMOL/L
POTASSIUM SERPL-SCNC: 5 MMOL/L
PROT SERPL-MCNC: 7.4 G/DL
SODIUM SERPL-SCNC: 137 MMOL/L
SODIUM SERPL-SCNC: 137 MMOL/L

## 2018-03-14 PROCEDURE — 83880 ASSAY OF NATRIURETIC PEPTIDE: CPT

## 2018-03-14 PROCEDURE — 80053 COMPREHEN METABOLIC PANEL: CPT

## 2018-03-14 PROCEDURE — 99999 PR PBB SHADOW E&M-EST. PATIENT-LVL III: CPT | Mod: PBBFAC,,, | Performed by: INTERNAL MEDICINE

## 2018-03-14 PROCEDURE — 3078F DIAST BP <80 MM HG: CPT | Mod: CPTII,S$GLB,, | Performed by: INTERNAL MEDICINE

## 2018-03-14 PROCEDURE — 3074F SYST BP LT 130 MM HG: CPT | Mod: CPTII,S$GLB,, | Performed by: INTERNAL MEDICINE

## 2018-03-14 PROCEDURE — 36415 COLL VENOUS BLD VENIPUNCTURE: CPT

## 2018-03-14 PROCEDURE — 99214 OFFICE O/P EST MOD 30 MIN: CPT | Mod: S$GLB,,, | Performed by: INTERNAL MEDICINE

## 2018-03-14 NOTE — PROGRESS NOTES
"Subjective:     HPI:  Very pleasant 80 y/o female with Hx of HTN, diastolic dysfunction, obesity and KHANH (not using CPAP) who is referred for evaluation and management of pulmonary HTN. She is accompanied by her daughter. Clinically reports NYHA/WHO  Class III symptoms. Most recent echo done earlier  this year showed preserved LV function with normal LV size, mild concentric hypertrophy, PASP of 75 mm of Hg. RHC done here at [facility] showed moderate to sever PH with high elevated PCWP consistent with group 2 PH. Labs are pending.    Past Medical History:   Diagnosis Date    DVT (deep venous thrombosis)     Edema     Hyperlipidemia     Hypertension     KHANH (obstructive sleep apnea)      History reviewed. No pertinent surgical history.  OB History     No data available        Review of Systems   Constitution: Negative. Negative for chills, decreased appetite, diaphoresis, fever, weakness, malaise/fatigue, night sweats, weight gain and weight loss.   Eyes: Negative.    Cardiovascular: Positive for dyspnea on exertion and orthopnea. Negative for chest pain, claudication, cyanosis, irregular heartbeat, leg swelling, near-syncope, palpitations, paroxysmal nocturnal dyspnea and syncope.   Respiratory: Negative for cough, hemoptysis and shortness of breath.    Endocrine: Negative.    Hematologic/Lymphatic: Negative.    Skin: Negative for color change, dry skin and nail changes.   Musculoskeletal: Negative.    Gastrointestinal: Negative.    Genitourinary: Negative.        Objective:   Blood pressure (!) 121/49, pulse 65, height 5' 1" (1.549 m), weight 109.2 kg (240 lb 11.9 oz).body mass index is 45.49 kg/m².  Physical Exam   Constitutional: She is oriented to person, place, and time. Vital signs are normal. She appears well-developed and well-nourished.   HENT:   Head: Normocephalic.   Eyes: Pupils are equal, round, and reactive to light.   Neck: Neck supple. No JVD present.   Cardiovascular: Normal rate, regular rhythm, " normal heart sounds and intact distal pulses.  PMI is displaced.  Exam reveals no gallop and no friction rub.    No murmur heard.  Pulmonary/Chest: Effort normal and breath sounds normal. No respiratory distress. She has no wheezes. She has no rales.   Abdominal: Soft. Bowel sounds are normal. She exhibits no distension. There is no tenderness. There is no rebound.   Musculoskeletal: She exhibits edema.   Neurological: She is alert and oriented to person, place, and time.   Nursing note and vitals reviewed.      Labs:    Chemistry        Component Value Date/Time     09/11/2017 0728    K 4.5 09/11/2017 0728     09/11/2017 0728    CO2 30 (H) 09/11/2017 0728    BUN 41 (H) 09/11/2017 0728    CREATININE 1.0 09/11/2017 0728    GLU 90 09/11/2017 0728        Component Value Date/Time    CALCIUM 9.3 09/11/2017 0728    ALKPHOS 59 09/11/2017 0728    AST 16 09/11/2017 0728    ALT 8 (L) 09/11/2017 0728    BILITOT 0.6 09/11/2017 0728    ESTGFRAFRICA >60.0 09/11/2017 0728    EGFRNONAA 53.7 (A) 09/11/2017 0728          Magnesium   Date Value Ref Range Status   11/04/2016 1.8 1.6 - 2.6 mg/dL Final     Lab Results   Component Value Date    WBC 5.78 11/15/2016    HGB 14.6 11/15/2016    HCT 45.5 11/15/2016     11/15/2016     Lab Results   Component Value Date    INR 1.0 11/08/2016     BNP   Date Value Ref Range Status   09/11/2017 94 0 - 99 pg/mL Final     Comment:     Values of less than 100 pg/ml are consistent with non-CHF populations.   06/09/2017 68 0 - 99 pg/mL Final     Comment:     Values of less than 100 pg/ml are consistent with non-CHF populations.   12/12/2016 75 0 - 99 pg/mL Final     Comment:     Values of less than 100 pg/ml are consistent with non-CHF populations.       Assessment:      1. Chronic diastolic congestive heart failure    2. Pulmonary hypertension    3. Essential hypertension    4. Coronary artery disease involving native coronary artery of native heart without angina pectoris         Plan:   82 y/o female with above Hx and severe pulmonary HTN by 2D echo and RHC. Mixed picture of  WHO Group 2 (HTN, diastolic dysfunction) and 3 (untreated KHANH and obesity hypoventilation syndrome) PH.   Continue current Tx  Had a long discussion with the patient about the importance of using her CPAP and losing weight. Still does not want to use it (states it's too noisy).  Recommend 2 gram sodium restriction and 1500cc fluid restriction.  Encourage physical activity with graded exercise program.  Requested patient to weigh themselves daily, and to notify us if their weight increases by more than 3 lbs in 1 day or 5 lbs in 1 week.    RTC in 6 months with labs      Rm Nguyễn MD

## 2018-04-04 DIAGNOSIS — I10 ESSENTIAL HYPERTENSION: ICD-10-CM

## 2018-04-04 RX ORDER — METOPROLOL SUCCINATE 50 MG/1
TABLET, EXTENDED RELEASE ORAL
Qty: 90 TABLET | Refills: 2 | Status: SHIPPED | OUTPATIENT
Start: 2018-04-04 | End: 2019-02-03 | Stop reason: SDUPTHER

## 2018-06-17 DIAGNOSIS — I27.20 PULMONARY HYPERTENSION: ICD-10-CM

## 2018-06-18 RX ORDER — LISINOPRIL 10 MG/1
TABLET ORAL
Qty: 90 TABLET | Refills: 2 | Status: SHIPPED | OUTPATIENT
Start: 2018-06-18 | End: 2018-09-11

## 2018-06-29 DIAGNOSIS — I50.32 CHRONIC DIASTOLIC CONGESTIVE HEART FAILURE: ICD-10-CM

## 2018-06-29 DIAGNOSIS — I27.20 PULMONARY HYPERTENSION: ICD-10-CM

## 2018-06-29 RX ORDER — SPIRONOLACTONE 25 MG/1
TABLET ORAL
Qty: 90 TABLET | Refills: 2 | Status: SHIPPED | OUTPATIENT
Start: 2018-06-29

## 2018-06-29 RX ORDER — TORSEMIDE 10 MG/1
TABLET ORAL
Qty: 90 TABLET | Refills: 2 | Status: SHIPPED | OUTPATIENT
Start: 2018-06-29 | End: 2018-09-11

## 2018-08-07 RX ORDER — NITROGLYCERIN 0.4 MG/1
0.4 TABLET SUBLINGUAL EVERY 5 MIN PRN
Qty: 25 TABLET | Refills: 12 | Status: SHIPPED | OUTPATIENT
Start: 2018-08-07 | End: 2019-08-07

## 2018-08-07 NOTE — TELEPHONE ENCOUNTER
I spoke with pt's daughter and with pt.   Pt fell a couple of weeks ago.  Legs are more swollen.  Pt won't use CPAP or mouthpiece for KHANH  It is hard to get pt into car.    Pt needs an appt and repeat lab.  Pt may need home health.  OK to increase the torsemide and spironolactone to bid for 3 days only.

## 2018-08-28 PROBLEM — N17.9 AKI (ACUTE KIDNEY INJURY): Status: ACTIVE | Noted: 2018-08-21

## 2018-08-28 PROBLEM — D64.9 ANEMIA: Status: ACTIVE | Noted: 2018-08-28

## 2018-09-11 ENCOUNTER — OFFICE VISIT (OUTPATIENT)
Dept: CARDIOLOGY | Facility: CLINIC | Age: 83
End: 2018-09-11
Payer: MEDICARE

## 2018-09-11 ENCOUNTER — HOSPITAL ENCOUNTER (OUTPATIENT)
Dept: RADIOLOGY | Facility: HOSPITAL | Age: 83
Discharge: HOME | End: 2018-09-11
Attending: INTERNAL MEDICINE
Payer: MEDICARE

## 2018-09-11 ENCOUNTER — TELEPHONE (OUTPATIENT)
Dept: CARDIOLOGY | Facility: CLINIC | Age: 83
End: 2018-09-11

## 2018-09-11 VITALS
SYSTOLIC BLOOD PRESSURE: 134 MMHG | HEIGHT: 61 IN | BODY MASS INDEX: 45.37 KG/M2 | DIASTOLIC BLOOD PRESSURE: 53 MMHG | HEART RATE: 64 BPM | WEIGHT: 240.31 LBS

## 2018-09-11 DIAGNOSIS — I25.10 CAD (CORONARY ARTERY DISEASE), NATIVE CORONARY ARTERY: ICD-10-CM

## 2018-09-11 DIAGNOSIS — R60.0 LOCALIZED EDEMA: ICD-10-CM

## 2018-09-11 DIAGNOSIS — I83.019 VENOUS STASIS ULCERS OF BOTH LOWER EXTREMITIES: ICD-10-CM

## 2018-09-11 DIAGNOSIS — I27.9 CHRONIC PULMONARY HEART DISEASE: ICD-10-CM

## 2018-09-11 DIAGNOSIS — L97.929 VENOUS STASIS ULCERS OF BOTH LOWER EXTREMITIES: ICD-10-CM

## 2018-09-11 DIAGNOSIS — G47.33 OSA ON CPAP: ICD-10-CM

## 2018-09-11 DIAGNOSIS — I35.0 NONRHEUMATIC AORTIC VALVE STENOSIS: ICD-10-CM

## 2018-09-11 DIAGNOSIS — E78.2 MIXED HYPERLIPIDEMIA: ICD-10-CM

## 2018-09-11 DIAGNOSIS — I87.2 VENOUS STASIS DERMATITIS OF BOTH LOWER EXTREMITIES: ICD-10-CM

## 2018-09-11 DIAGNOSIS — I10 ESSENTIAL HYPERTENSION: ICD-10-CM

## 2018-09-11 DIAGNOSIS — I50.32 CHRONIC DIASTOLIC CONGESTIVE HEART FAILURE: ICD-10-CM

## 2018-09-11 DIAGNOSIS — I27.20 PULMONARY HYPERTENSION: ICD-10-CM

## 2018-09-11 DIAGNOSIS — I83.029 VENOUS STASIS ULCERS OF BOTH LOWER EXTREMITIES: ICD-10-CM

## 2018-09-11 DIAGNOSIS — L97.919 VENOUS STASIS ULCERS OF BOTH LOWER EXTREMITIES: ICD-10-CM

## 2018-09-11 DIAGNOSIS — I25.118 CORONARY ARTERY DISEASE OF NATIVE ARTERY OF NATIVE HEART WITH STABLE ANGINA PECTORIS: Primary | ICD-10-CM

## 2018-09-11 PROCEDURE — 71046 X-RAY EXAM CHEST 2 VIEWS: CPT | Mod: 26,,, | Performed by: RADIOLOGY

## 2018-09-11 PROCEDURE — 99213 OFFICE O/P EST LOW 20 MIN: CPT | Mod: PBBFAC,PO,25 | Performed by: INTERNAL MEDICINE

## 2018-09-11 PROCEDURE — 3078F DIAST BP <80 MM HG: CPT | Mod: CPTII,,, | Performed by: INTERNAL MEDICINE

## 2018-09-11 PROCEDURE — 99214 OFFICE O/P EST MOD 30 MIN: CPT | Mod: S$PBB,,, | Performed by: INTERNAL MEDICINE

## 2018-09-11 PROCEDURE — 3075F SYST BP GE 130 - 139MM HG: CPT | Mod: CPTII,,, | Performed by: INTERNAL MEDICINE

## 2018-09-11 PROCEDURE — 93005 ELECTROCARDIOGRAM TRACING: CPT | Mod: PBBFAC,PO | Performed by: INTERNAL MEDICINE

## 2018-09-11 PROCEDURE — 99999 PR PBB SHADOW E&M-EST. PATIENT-LVL III: CPT | Mod: PBBFAC,,, | Performed by: INTERNAL MEDICINE

## 2018-09-11 PROCEDURE — 93010 ELECTROCARDIOGRAM REPORT: CPT | Mod: S$PBB,59,, | Performed by: INTERNAL MEDICINE

## 2018-09-11 PROCEDURE — 1101F PT FALLS ASSESS-DOCD LE1/YR: CPT | Mod: CPTII,,, | Performed by: INTERNAL MEDICINE

## 2018-09-11 PROCEDURE — 71046 X-RAY EXAM CHEST 2 VIEWS: CPT | Mod: TC,FY

## 2018-09-11 RX ORDER — TORSEMIDE 20 MG/1
20 TABLET ORAL DAILY
Qty: 90 TABLET | Refills: 3 | Status: SHIPPED | OUTPATIENT
Start: 2018-09-11 | End: 2018-11-29

## 2018-09-11 RX ORDER — FERROUS SULFATE 325(65) MG
325 TABLET ORAL
COMMUNITY
End: 2018-11-29

## 2018-09-11 RX ORDER — PANTOPRAZOLE SODIUM 40 MG/1
TABLET, DELAYED RELEASE ORAL
COMMUNITY
Start: 2018-08-30 | End: 2018-11-29

## 2018-09-11 RX ORDER — DOXYCYCLINE 100 MG/1
CAPSULE ORAL
COMMUNITY
Start: 2018-08-30 | End: 2018-09-18

## 2018-09-11 NOTE — PROGRESS NOTES
Subjective:      Patient ID: Lily Hernandez is a 82 y.o. female.    Chief Complaint: Chest Pain (Occasional chest pain )    HPI:  Pt has been hospitalized 4 times at [facility].  ONce with a slip and fall getting out of bet.  Once with confusion associated with hyperkalemia and renal failure. Once with cellulitis of the legs.   Diuretics were held by nephrologist temporarily and then later resumed.   Pt c/o cough productive of clear sputum.  No fever.  Pt c/o shortness of breath walking very short distances.  Pt c/o recent runny nose.  Pt c/o worse swelling of legs.  Review of Systems   Cardiovascular: Positive for chest pain (when walking to the bathroom.), dyspnea on exertion, leg swelling and orthopnea. Negative for claudication, irregular heartbeat, near-syncope, palpitations and syncope.      On oxygen  Past Medical History:   Diagnosis Date    DVT (deep venous thrombosis)     Edema     Hyperlipidemia     Hypertension     KHANH (obstructive sleep apnea)         No past surgical history on file.    No family history on file.    Social History     Socioeconomic History    Marital status:      Spouse name: None    Number of children: None    Years of education: None    Highest education level: None   Social Needs    Financial resource strain: None    Food insecurity - worry: None    Food insecurity - inability: None    Transportation needs - medical: None    Transportation needs - non-medical: None   Occupational History    None   Tobacco Use    Smoking status: Never Smoker    Smokeless tobacco: Never Used   Substance and Sexual Activity    Alcohol use: None    Drug use: None    Sexual activity: None   Other Topics Concern    None   Social History Narrative    None       Current Outpatient Medications on File Prior to Visit   Medication Sig Dispense Refill    acetaminophen (TYLENOL) 500 MG tablet Take 500 mg by mouth every 6 (six) hours as needed for Pain.      aspirin (ECOTRIN) 81 MG EC  "tablet Take 81 mg by mouth once daily.      azelastine (ASTELIN) 137 mcg (0.1 %) nasal spray       calcium carbonate (OS-SAMSON) 600 mg (1,500 mg) Tab Take 600 mg by mouth 2 (two) times daily with meals.      doxycycline (VIBRAMYCIN) 100 MG Cap       ferrous sulfate 325 mg (65 mg iron) Tab tablet Take 325 mg by mouth daily with breakfast.      fluticasone (FLONASE) 50 mcg/actuation nasal spray 1 spray by Each Nare route 2 (two) times daily. 1 Bottle 0    lovastatin (MEVACOR) 40 MG tablet 40 mg nightly.   0    metoprolol succinate (TOPROL-XL) 50 MG 24 hr tablet TAKE 1 TABLET BY MOUTH EVERY DAY 90 tablet 2    nitroGLYCERIN (NITROSTAT) 0.4 MG SL tablet Place 1 tablet (0.4 mg total) under the tongue every 5 (five) minutes as needed for Chest pain. 25 tablet 12    pantoprazole (PROTONIX) 40 MG tablet       spironolactone (ALDACTONE) 25 MG tablet TAKE 1 TABLET BY MOUTH EVERY DAY 90 tablet 2    [DISCONTINUED] torsemide (DEMADEX) 10 MG Tab TAKE 1 TABLET BY MOUTH EVERY DAY 90 tablet 2    cholecalciferol, vitamin D3, 5,000 unit Tab Take 5,000 Units by mouth once daily.      [DISCONTINUED] lisinopril 10 MG tablet TAKE 1 TABLET BY MOUTH EVERY DAY 90 tablet 2     No current facility-administered medications on file prior to visit.        Review of patient's allergies indicates:   Allergen Reactions    Endocaine     Noroxin [norfloxacin]     Tramadol     Tussin ac      Objective:     Vitals:    09/11/18 0816   BP: (!) 134/53   BP Location: Left arm   Patient Position: Sitting   BP Method: Medium (Automatic)   Pulse: 64   Weight: 109 kg (240 lb 4.8 oz)   Height: 5' 1" (1.549 m)        Physical Exam   Constitutional: She is oriented to person, place, and time. She appears well-developed and well-nourished.   Eyes: No scleral icterus.   Neck: No JVD present. Carotid bruit is not present.   Cardiovascular: Normal rate and regular rhythm. Exam reveals no gallop.   Murmur (IV/ VI systolic) heard.  Pulmonary/Chest: She has " wheezes. She has rales.   Musculoskeletal: She exhibits edema (Marked edema and erythema and tenderness bilaterally.  Weeping of cracked skin LLE).   Neurological: She is alert and oriented to person, place, and time.   Skin: Skin is warm and dry.   Psychiatric: She has a normal mood and affect. Her behavior is normal. Judgment and thought content normal.   Vitals reviewed.     Wt stable    ECG: NSR, WNL  Assessment:     1. Coronary artery disease of native artery of native heart with stable angina pectoris    2. CAD (coronary artery disease), native coronary artery    3. Chronic pulmonary heart disease    4. Pulmonary hypertension    5. Essential hypertension    6. Mixed hyperlipidemia    7. Venous stasis dermatitis of both lower extremities    8. Nonrheumatic aortic valve stenosis    9. Chronic diastolic congestive heart failure    10. Localized edema    11. KHANH on CPAP    12. Venous stasis ulcers of both lower extremities      Plan:   Lily was seen today for chest pain.    Diagnoses and all orders for this visit:    Coronary artery disease of native artery of native heart with stable angina pectoris  -     IN OFFICE EKG 12-LEAD (to Muse)    CAD (coronary artery disease), native coronary artery  -     IN OFFICE EKG 12-LEAD (to Muse)    Chronic pulmonary heart disease    Pulmonary hypertension    Essential hypertension    Mixed hyperlipidemia    Venous stasis dermatitis of both lower extremities    Nonrheumatic aortic valve stenosis    Chronic diastolic congestive heart failure  -     Basic metabolic panel; Standing  -     X-Ray Chest PA And Lateral; Future    Localized edema    KHANH on CPAP    Venous stasis ulcers of both lower extremities  -     Ambulatory consult to Wound Clinic    Other orders  -     torsemide (DEMADEX) 20 MG Tab; Take 1 tablet (20 mg total) by mouth once daily.     Continue doxycycline for cellulitis and venous stasis ulcers of lower extremities and for acute bronchitis.  Elevation of legs  discussed. discussed.    Referral to [facility] wound care.     Need copies of recent echo and CXR and labs from [facility]    CXR today    Weekly BMP by home health    Same meds plus increase the torsemide to 20 mg daily      Follow-up in about 4 weeks (around 10/9/2018).     F/u with Dr Cloud    F/u with Dr Espino

## 2018-09-18 PROBLEM — N18.2 CHRONIC KIDNEY DISEASE, STAGE II (MILD): Status: ACTIVE | Noted: 2018-09-18

## 2018-09-18 PROBLEM — N18.30 CHRONIC KIDNEY DISEASE, STAGE III (MODERATE): Status: ACTIVE | Noted: 2018-09-18

## 2018-09-18 PROBLEM — E87.3 METABOLIC ALKALOSIS: Status: ACTIVE | Noted: 2018-09-18

## 2018-09-18 PROBLEM — E87.70 HYPERVOLEMIA: Status: ACTIVE | Noted: 2018-09-18

## 2018-10-09 ENCOUNTER — OFFICE VISIT (OUTPATIENT)
Dept: CARDIOLOGY | Facility: CLINIC | Age: 83
End: 2018-10-09
Payer: MEDICARE

## 2018-10-09 VITALS
HEART RATE: 78 BPM | BODY MASS INDEX: 48.04 KG/M2 | HEIGHT: 60 IN | DIASTOLIC BLOOD PRESSURE: 61 MMHG | SYSTOLIC BLOOD PRESSURE: 128 MMHG

## 2018-10-09 DIAGNOSIS — G47.33 OSA ON CPAP: ICD-10-CM

## 2018-10-09 DIAGNOSIS — I25.10 CORONARY ARTERY DISEASE INVOLVING NATIVE CORONARY ARTERY OF NATIVE HEART WITHOUT ANGINA PECTORIS: ICD-10-CM

## 2018-10-09 DIAGNOSIS — I27.20 PULMONARY HYPERTENSION: ICD-10-CM

## 2018-10-09 DIAGNOSIS — I83.019 VENOUS STASIS ULCERS OF BOTH LOWER EXTREMITIES: ICD-10-CM

## 2018-10-09 DIAGNOSIS — I35.0 AORTIC VALVE STENOSIS, ETIOLOGY OF CARDIAC VALVE DISEASE UNSPECIFIED: ICD-10-CM

## 2018-10-09 DIAGNOSIS — I10 ESSENTIAL HYPERTENSION: ICD-10-CM

## 2018-10-09 DIAGNOSIS — L97.919 VENOUS STASIS ULCERS OF BOTH LOWER EXTREMITIES: ICD-10-CM

## 2018-10-09 DIAGNOSIS — I05.0 MITRAL VALVE STENOSIS, UNSPECIFIED ETIOLOGY: ICD-10-CM

## 2018-10-09 DIAGNOSIS — I50.43 ACUTE ON CHRONIC COMBINED SYSTOLIC AND DIASTOLIC CONGESTIVE HEART FAILURE: Primary | ICD-10-CM

## 2018-10-09 DIAGNOSIS — I87.2 VENOUS STASIS DERMATITIS OF BOTH LOWER EXTREMITIES: ICD-10-CM

## 2018-10-09 DIAGNOSIS — L97.929 VENOUS STASIS ULCERS OF BOTH LOWER EXTREMITIES: ICD-10-CM

## 2018-10-09 DIAGNOSIS — E78.2 MIXED HYPERLIPIDEMIA: ICD-10-CM

## 2018-10-09 DIAGNOSIS — I83.029 VENOUS STASIS ULCERS OF BOTH LOWER EXTREMITIES: ICD-10-CM

## 2018-10-09 PROCEDURE — 99214 OFFICE O/P EST MOD 30 MIN: CPT | Mod: S$PBB,,, | Performed by: INTERNAL MEDICINE

## 2018-10-09 PROCEDURE — 1101F PT FALLS ASSESS-DOCD LE1/YR: CPT | Mod: CPTII,,, | Performed by: INTERNAL MEDICINE

## 2018-10-09 PROCEDURE — 99999 PR PBB SHADOW E&M-EST. PATIENT-LVL III: CPT | Mod: PBBFAC,,, | Performed by: INTERNAL MEDICINE

## 2018-10-09 PROCEDURE — 99213 OFFICE O/P EST LOW 20 MIN: CPT | Mod: PBBFAC,PO | Performed by: INTERNAL MEDICINE

## 2018-10-09 PROCEDURE — 3078F DIAST BP <80 MM HG: CPT | Mod: CPTII,,, | Performed by: INTERNAL MEDICINE

## 2018-10-09 PROCEDURE — 3074F SYST BP LT 130 MM HG: CPT | Mod: CPTII,,, | Performed by: INTERNAL MEDICINE

## 2018-10-09 NOTE — PROGRESS NOTES
Subjective:      Patient ID: Lily Hernandez is a 82 y.o. female.    Chief Complaint: Follow-up (CHF)    HPI:  Pt c/o worsening shortness of breath and worsening swelling of both legs. Pt has weeping ulcers of both legs.  Pt has home health and wound care.    Pt is too short of breath to get on the scale today.      Review of Systems   Cardiovascular: Positive for dyspnea on exertion, leg swelling and orthopnea. Negative for chest pain, claudication, irregular heartbeat, near-syncope, palpitations and syncope.      Pt also sees Dr Lewis's nurse practitioner and wound care doctor at [facility] and nephrologist, Dr Espino    Pt sleeps sitting up on sofa.    Pt walks to bathroom in house with assistance and walker  Past Medical History:   Diagnosis Date    DVT (deep venous thrombosis)     Edema     Hyperlipidemia     Hypertension     KHANH (obstructive sleep apnea)         Past Surgical History:   Procedure Laterality Date    CARPAL TUNNEL RELEASE Left     CATARACT EXTRACTION      GALLBLADDER SURGERY      HERNIA REPAIR      HYSTERECTOMY      PARS PLANA VITRECTOMY W/ REPAIR OF MACULAR HOLE      TOTAL KNEE ARTHROPLASTY Bilateral        Family History   Problem Relation Age of Onset    Cancer Mother     Diabetes Father     Heart disease Father        Social History     Socioeconomic History    Marital status:      Spouse name: None    Number of children: None    Years of education: None    Highest education level: None   Social Needs    Financial resource strain: None    Food insecurity - worry: None    Food insecurity - inability: None    Transportation needs - medical: None    Transportation needs - non-medical: None   Occupational History    None   Tobacco Use    Smoking status: Never Smoker    Smokeless tobacco: Never Used   Substance and Sexual Activity    Alcohol use: None    Drug use: None    Sexual activity: None   Other Topics Concern    None   Social History Narrative    None        Current Outpatient Medications on File Prior to Visit   Medication Sig Dispense Refill    acetaminophen (TYLENOL) 500 MG tablet Take 500 mg by mouth every 6 (six) hours as needed for Pain.      aspirin (ECOTRIN) 81 MG EC tablet Take 81 mg by mouth once daily.      azelastine (ASTELIN) 137 mcg (0.1 %) nasal spray       calcium carbonate (OS-SAMSON) 600 mg (1,500 mg) Tab Take 600 mg by mouth 2 (two) times daily with meals.      ferrous sulfate 325 mg (65 mg iron) Tab tablet Take 325 mg by mouth daily with breakfast.      lovastatin (MEVACOR) 40 MG tablet 40 mg nightly.   0    metoprolol succinate (TOPROL-XL) 50 MG 24 hr tablet TAKE 1 TABLET BY MOUTH EVERY DAY 90 tablet 2    nitroGLYCERIN (NITROSTAT) 0.4 MG SL tablet Place 1 tablet (0.4 mg total) under the tongue every 5 (five) minutes as needed for Chest pain. 25 tablet 12    pantoprazole (PROTONIX) 40 MG tablet       spironolactone (ALDACTONE) 25 MG tablet TAKE 1 TABLET BY MOUTH EVERY DAY 90 tablet 2    torsemide (DEMADEX) 20 MG Tab Take 1 tablet (20 mg total) by mouth once daily. 90 tablet 3     No current facility-administered medications on file prior to visit.        Review of patient's allergies indicates:   Allergen Reactions    Lasix [furosemide] Other (See Comments)     pancreatitis    Bactrim [sulfamethoxazole-trimethoprim] Other (See Comments)     AIN    Endocaine     Noroxin [norfloxacin]     Tramadol     Tussin       Objective:     Vitals:    10/09/18 1008 10/09/18 1012   BP: (!) 109/42 128/61   BP Location: Left arm    Patient Position: Sitting    BP Method: Large (Automatic)    Pulse: 79 78   Weight: Comment: Unable to stand    Height: 5' (1.524 m)         Physical Exam   Constitutional: She is oriented to person, place, and time. She appears well-developed and well-nourished. She appears distressed (moderate respiratory distress in wheelchair).   Eyes: No scleral icterus.   Neck: No JVD present. Carotid bruit is not present.    Cardiovascular: Normal rate and regular rhythm. Exam reveals no gallop.   Murmur (IV/VI systolic ejection murmur ) heard.  Pulmonary/Chest: She is in respiratory distress. Wheezes: bibasilar. She has rales.   Pt is tachypneic in spite of portable oxygen   Musculoskeletal: She exhibits edema (marked pitting edema of both lower extremities bilaterally  Both legs are dressed.).   Neurological: She is alert and oriented to person, place, and time.   Skin: Skin is warm and dry.   Psychiatric: She has a normal mood and affect. Her behavior is normal. Judgment and thought content normal.      Based on echo in 2016 the AS was mild to moderate and there was also mild MS    Echo at [facility] in 2017 showed similar aortic stenosis; LVEF WNL    CXR shows chronically elevated right hemidiaphragm    Coronary angiogram at [facility] in 2017shows non-obstructive CAD    Right heart cath at [facility] showed pulmonary hypertension with elevated PCWP    BMP from last week shows normal renal function    Echo from [facility] is still not available in chart  Assessment:     1. Acute on chronic combined systolic and diastolic congestive heart failure    2. Aortic valve stenosis, etiology of cardiac valve disease unspecified    3. Pulmonary hypertension    4. KHANH on CPAP    5. Mixed hyperlipidemia    6. Essential hypertension    7. Coronary artery disease involving native coronary artery of native heart without angina pectoris    8. Venous stasis dermatitis of both lower extremities    9. Venous stasis ulcers of both lower extremities    10. Mitral valve stenosis, unspecified etiology      Plan:   Lily was seen today for follow-up.    Diagnoses and all orders for this visit:    Acute on chronic combined systolic and diastolic congestive heart failure    Aortic valve stenosis, etiology of cardiac valve disease unspecified    Pulmonary hypertension    KHANH on CPAP    Mixed hyperlipidemia    Essential hypertension    Coronary artery disease involving  native coronary artery of native heart without angina pectoris    Venous stasis dermatitis of both lower extremities    Venous stasis ulcers of both lower extremities    Mitral valve stenosis, unspecified etiology     Pt may need to be diuresed in the hospital with close monitoring of renal function  Pt's anemia needs to be re-evaluated  Pt may need to be reevaluated for DVT/PE  Severity of AS needs to be reevaluated.  Option of doubling the torsemide and spironolactone with close monitoring of renal function and electrolytes by home health discussed.  Pt is willing to go to ER for re-evaluation.  Family will take pt to [facility]    Follow-up in about 4 weeks (around 11/6/2018).

## 2018-10-16 ENCOUNTER — OFFICE VISIT (OUTPATIENT)
Dept: CARDIOLOGY | Facility: CLINIC | Age: 83
End: 2018-10-16
Payer: MEDICARE

## 2018-10-16 VITALS
DIASTOLIC BLOOD PRESSURE: 51 MMHG | HEIGHT: 60 IN | BODY MASS INDEX: 46.37 KG/M2 | SYSTOLIC BLOOD PRESSURE: 130 MMHG | OXYGEN SATURATION: 97 % | WEIGHT: 236.19 LBS | HEART RATE: 66 BPM

## 2018-10-16 DIAGNOSIS — I25.10 CORONARY ARTERY DISEASE INVOLVING NATIVE CORONARY ARTERY OF NATIVE HEART WITHOUT ANGINA PECTORIS: ICD-10-CM

## 2018-10-16 DIAGNOSIS — I87.2 VENOUS STASIS DERMATITIS OF BOTH LOWER EXTREMITIES: ICD-10-CM

## 2018-10-16 DIAGNOSIS — I06.0 RHEUMATIC AORTIC STENOSIS: ICD-10-CM

## 2018-10-16 DIAGNOSIS — I83.019 VENOUS STASIS ULCERS OF BOTH LOWER EXTREMITIES: ICD-10-CM

## 2018-10-16 DIAGNOSIS — I10 ESSENTIAL HYPERTENSION: ICD-10-CM

## 2018-10-16 DIAGNOSIS — L97.919 VENOUS STASIS ULCERS OF BOTH LOWER EXTREMITIES: ICD-10-CM

## 2018-10-16 DIAGNOSIS — I27.9 CHRONIC PULMONARY HEART DISEASE: ICD-10-CM

## 2018-10-16 DIAGNOSIS — I27.20 PULMONARY HYPERTENSION: ICD-10-CM

## 2018-10-16 DIAGNOSIS — L97.929 VENOUS STASIS ULCERS OF BOTH LOWER EXTREMITIES: ICD-10-CM

## 2018-10-16 DIAGNOSIS — I50.43 ACUTE ON CHRONIC COMBINED SYSTOLIC AND DIASTOLIC CONGESTIVE HEART FAILURE: Primary | ICD-10-CM

## 2018-10-16 DIAGNOSIS — E78.2 MIXED HYPERLIPIDEMIA: ICD-10-CM

## 2018-10-16 DIAGNOSIS — I05.0 RHEUMATIC MITRAL STENOSIS: ICD-10-CM

## 2018-10-16 DIAGNOSIS — I83.029 VENOUS STASIS ULCERS OF BOTH LOWER EXTREMITIES: ICD-10-CM

## 2018-10-16 DIAGNOSIS — R60.0 LOCALIZED EDEMA: ICD-10-CM

## 2018-10-16 DIAGNOSIS — G47.33 OSA ON CPAP: ICD-10-CM

## 2018-10-16 DIAGNOSIS — N18.30 CHRONIC KIDNEY DISEASE, STAGE III (MODERATE): ICD-10-CM

## 2018-10-16 PROCEDURE — 93005 ELECTROCARDIOGRAM TRACING: CPT | Mod: PBBFAC,PO | Performed by: INTERNAL MEDICINE

## 2018-10-16 PROCEDURE — 99214 OFFICE O/P EST MOD 30 MIN: CPT | Mod: 25,S$PBB,, | Performed by: INTERNAL MEDICINE

## 2018-10-16 PROCEDURE — 3078F DIAST BP <80 MM HG: CPT | Mod: CPTII,,, | Performed by: INTERNAL MEDICINE

## 2018-10-16 PROCEDURE — 99999 PR PBB SHADOW E&M-EST. PATIENT-LVL III: CPT | Mod: PBBFAC,,, | Performed by: INTERNAL MEDICINE

## 2018-10-16 PROCEDURE — 3075F SYST BP GE 130 - 139MM HG: CPT | Mod: CPTII,,, | Performed by: INTERNAL MEDICINE

## 2018-10-16 PROCEDURE — 99213 OFFICE O/P EST LOW 20 MIN: CPT | Mod: PBBFAC,PO | Performed by: INTERNAL MEDICINE

## 2018-10-16 PROCEDURE — 93010 ELECTROCARDIOGRAM REPORT: CPT | Mod: S$PBB,59,, | Performed by: INTERNAL MEDICINE

## 2018-10-16 PROCEDURE — 1101F PT FALLS ASSESS-DOCD LE1/YR: CPT | Mod: CPTII,,, | Performed by: INTERNAL MEDICINE

## 2018-10-16 NOTE — PROGRESS NOTES
Subjective:      Patient ID: Lily Hernandez is a 82 y.o. female.    Chief Complaint: No chief complaint on file.    HPI:  Pt was hospitalized at [facility] for 3 nights and diuresed with bumetanide.  Pt feels hot all the time.  Pt is breathing easier since in-pt diuresis.  Pt states her legs are less swollen.  Home nurse changed leg dressings this AM and there are currently no weeping ulcers.  Pt sleeps sitting up in the sofa because she is too short of breath when in bed.  Home meds are unchanged at present compared with prior to admission.    Review of Systems   Cardiovascular: Positive for dyspnea on exertion, leg swelling and orthopnea. Negative for chest pain, claudication, irregular heartbeat, near-syncope, palpitations and syncope.        Past Medical History:   Diagnosis Date    DVT (deep venous thrombosis)     Edema     Hyperlipidemia     Hypertension     KHANH (obstructive sleep apnea)         Past Surgical History:   Procedure Laterality Date    CARPAL TUNNEL RELEASE Left     CATARACT EXTRACTION      GALLBLADDER SURGERY      HERNIA REPAIR      HYSTERECTOMY      PARS PLANA VITRECTOMY W/ REPAIR OF MACULAR HOLE      TOTAL KNEE ARTHROPLASTY Bilateral        Family History   Problem Relation Age of Onset    Cancer Mother     Diabetes Father     Heart disease Father        Social History     Socioeconomic History    Marital status:      Spouse name: None    Number of children: None    Years of education: None    Highest education level: None   Social Needs    Financial resource strain: None    Food insecurity - worry: None    Food insecurity - inability: None    Transportation needs - medical: None    Transportation needs - non-medical: None   Occupational History    None   Tobacco Use    Smoking status: Never Smoker    Smokeless tobacco: Never Used   Substance and Sexual Activity    Alcohol use: None    Drug use: None    Sexual activity: None   Other Topics Concern    None    Social History Narrative    None       Current Outpatient Medications on File Prior to Visit   Medication Sig Dispense Refill    acetaminophen (TYLENOL) 500 MG tablet Take 500 mg by mouth every 6 (six) hours as needed for Pain.      aspirin (ECOTRIN) 81 MG EC tablet Take 81 mg by mouth once daily.      azelastine (ASTELIN) 137 mcg (0.1 %) nasal spray       calcium carbonate (OS-SAMSON) 600 mg (1,500 mg) Tab Take 600 mg by mouth 2 (two) times daily with meals.      ferrous sulfate 325 mg (65 mg iron) Tab tablet Take 325 mg by mouth daily with breakfast.      lovastatin (MEVACOR) 40 MG tablet 40 mg nightly.   0    metoprolol succinate (TOPROL-XL) 50 MG 24 hr tablet TAKE 1 TABLET BY MOUTH EVERY DAY 90 tablet 2    nitroGLYCERIN (NITROSTAT) 0.4 MG SL tablet Place 1 tablet (0.4 mg total) under the tongue every 5 (five) minutes as needed for Chest pain. 25 tablet 12    pantoprazole (PROTONIX) 40 MG tablet       spironolactone (ALDACTONE) 25 MG tablet TAKE 1 TABLET BY MOUTH EVERY DAY 90 tablet 2    torsemide (DEMADEX) 20 MG Tab Take 1 tablet (20 mg total) by mouth once daily. 90 tablet 3     No current facility-administered medications on file prior to visit.        Review of patient's allergies indicates:   Allergen Reactions    Lasix [furosemide] Other (See Comments)     pancreatitis    Bactrim [sulfamethoxazole-trimethoprim] Other (See Comments)     AIN    Endocaine     Noroxin [norfloxacin]     Tramadol     Tussin ac      Objective:     Vitals:    10/16/18 1448   BP: (!) 130/51   BP Location: Right arm   Patient Position: Sitting   BP Method: Medium (Automatic)   Pulse: 66   SpO2: 97%  Comment: 2.5 liters nasal canula   Weight: 107.1 kg (236 lb 3.2 oz)   Height: 5' (1.524 m)        Physical Exam   Constitutional: She is oriented to person, place, and time. She appears well-developed and well-nourished.   Eyes: No scleral icterus.   Neck: No JVD present. Carotid bruit is not present.   Cardiovascular:  Normal rate and regular rhythm. Exam reveals no gallop.   Murmur (III/VI systolic) heard.  Pulmonary/Chest: Breath sounds normal.   Musculoskeletal: She exhibits edema (one to two plus edema bilaterally).   Neurological: She is alert and oriented to person, place, and time.   Skin: Skin is warm and dry.   Psychiatric: She has a normal mood and affect. Her behavior is normal. Judgment and thought content normal.   Vitals reviewed.   Legs dressed    ECG: NSR, WNL    [facility] hospital records reviewed:  Echocardiogram: LVH, LVEF 50-55%, mild to moderate aortic stenosis with peak gradient 42 mm Hg and mean gradient 25 mm Hg, mild MR, no MS, PAP 51 mm Hg  Pt devloped prerenal azotemia while in hospital    Wt down 10 lbs  Assessment:     1. Acute on chronic combined systolic and diastolic congestive heart failure    2. Rheumatic aortic stenosis    3. Venous stasis dermatitis of both lower extremities    4. Coronary artery disease involving native coronary artery of native heart without angina pectoris    5. Mixed hyperlipidemia    6. Essential hypertension    7. Chronic pulmonary heart disease    8. Pulmonary hypertension    9. Venous stasis ulcers of both lower extremities    10. Rheumatic mitral stenosis    11. Chronic kidney disease, stage III (moderate)    12. Localized edema    13. KHANH on CPAP      Plan:   Diagnoses and all orders for this visit:    Acute on chronic combined systolic and diastolic congestive heart failure    Rheumatic aortic stenosis    Venous stasis dermatitis of both lower extremities    Coronary artery disease involving native coronary artery of native heart without angina pectoris    Mixed hyperlipidemia    Essential hypertension    Chronic pulmonary heart disease    Pulmonary hypertension    Venous stasis ulcers of both lower extremities    Rheumatic mitral stenosis    Chronic kidney disease, stage III (moderate)    Localized edema    KHANH on CPAP       Long discussion with pt and daughter  Daily  weights  Double the torsemide and spironolactone prn weight gain  Try the CPAP again  Otherwise same meds  Check BMP by home nurse  'RTC 3 months  F/u with Dr Lewis as well  No Follow-up on file.

## 2018-10-22 PROBLEM — N17.9 AKI (ACUTE KIDNEY INJURY): Status: RESOLVED | Noted: 2018-08-21 | Resolved: 2018-10-22

## 2018-10-22 PROBLEM — D50.0 IRON DEFICIENCY ANEMIA DUE TO CHRONIC BLOOD LOSS: Status: ACTIVE | Noted: 2018-08-28

## 2018-11-05 PROBLEM — E66.01 MORBID OBESITY: Status: ACTIVE | Noted: 2018-11-05

## 2018-11-29 ENCOUNTER — OFFICE VISIT (OUTPATIENT)
Dept: CARDIOLOGY | Facility: CLINIC | Age: 83
End: 2018-11-29
Payer: MEDICARE

## 2018-11-29 VITALS
HEIGHT: 60 IN | DIASTOLIC BLOOD PRESSURE: 66 MMHG | SYSTOLIC BLOOD PRESSURE: 125 MMHG | BODY MASS INDEX: 45.94 KG/M2 | HEART RATE: 79 BPM | WEIGHT: 234 LBS

## 2018-11-29 DIAGNOSIS — E87.3 METABOLIC ALKALOSIS: ICD-10-CM

## 2018-11-29 DIAGNOSIS — I35.0 AORTIC VALVE STENOSIS, ETIOLOGY OF CARDIAC VALVE DISEASE UNSPECIFIED: ICD-10-CM

## 2018-11-29 DIAGNOSIS — I10 ESSENTIAL HYPERTENSION: ICD-10-CM

## 2018-11-29 DIAGNOSIS — L97.919 VENOUS STASIS ULCERS OF BOTH LOWER EXTREMITIES: Primary | ICD-10-CM

## 2018-11-29 DIAGNOSIS — I83.029 VENOUS STASIS ULCERS OF BOTH LOWER EXTREMITIES: Primary | ICD-10-CM

## 2018-11-29 DIAGNOSIS — I27.9 CHRONIC PULMONARY HEART DISEASE: ICD-10-CM

## 2018-11-29 DIAGNOSIS — I83.019 VENOUS STASIS ULCERS OF BOTH LOWER EXTREMITIES: Primary | ICD-10-CM

## 2018-11-29 DIAGNOSIS — E78.2 MIXED HYPERLIPIDEMIA: ICD-10-CM

## 2018-11-29 DIAGNOSIS — N18.30 CHRONIC KIDNEY DISEASE, STAGE III (MODERATE): ICD-10-CM

## 2018-11-29 DIAGNOSIS — I87.2 VENOUS STASIS DERMATITIS OF BOTH LOWER EXTREMITIES: ICD-10-CM

## 2018-11-29 DIAGNOSIS — I27.20 PULMONARY HYPERTENSION: ICD-10-CM

## 2018-11-29 DIAGNOSIS — L97.929 VENOUS STASIS ULCERS OF BOTH LOWER EXTREMITIES: Primary | ICD-10-CM

## 2018-11-29 DIAGNOSIS — I35.0 AORTIC STENOSIS: ICD-10-CM

## 2018-11-29 PROBLEM — E66.2 PICKWICKIAN SYNDROME: Status: ACTIVE | Noted: 2018-11-05

## 2018-11-29 PROCEDURE — 3078F DIAST BP <80 MM HG: CPT | Mod: CPTII,S$GLB,, | Performed by: INTERNAL MEDICINE

## 2018-11-29 PROCEDURE — 1101F PT FALLS ASSESS-DOCD LE1/YR: CPT | Mod: CPTII,S$GLB,, | Performed by: INTERNAL MEDICINE

## 2018-11-29 PROCEDURE — 99214 OFFICE O/P EST MOD 30 MIN: CPT | Mod: S$GLB,,, | Performed by: INTERNAL MEDICINE

## 2018-11-29 PROCEDURE — 93000 ELECTROCARDIOGRAM COMPLETE: CPT | Mod: S$GLB,,, | Performed by: INTERNAL MEDICINE

## 2018-11-29 PROCEDURE — 3074F SYST BP LT 130 MM HG: CPT | Mod: CPTII,S$GLB,, | Performed by: INTERNAL MEDICINE

## 2018-11-29 PROCEDURE — 99999 PR PBB SHADOW E&M-EST. PATIENT-LVL II: CPT | Mod: PBBFAC,,, | Performed by: INTERNAL MEDICINE

## 2018-11-29 RX ORDER — TORSEMIDE 5 MG/1
5 TABLET ORAL DAILY
Refills: 0 | COMMUNITY
Start: 2018-11-20

## 2018-11-29 NOTE — PROGRESS NOTES
Subjective:      Patient ID: Lily Hernandez is a 83 y.o. female.    Chief Complaint: Hospital Follow Up (CHF)    HPI:Hopitalized at [facility] with worse shortness of breath.  Diuretics were reduced and bi PAP ordered which pt is not using.  Feels OK  No weeping from legs  Review of Systems   Cardiovascular: Positive for chest pain (Took one NTG SL a few days ago for mild chest discomfort), dyspnea on exertion, leg swelling and orthopnea. Negative for claudication, irregular heartbeat, near-syncope, palpitations and syncope.        Past Medical History:   Diagnosis Date    DVT (deep venous thrombosis)     Edema     Hyperlipidemia     Hypertension     KHANH (obstructive sleep apnea)         Past Surgical History:   Procedure Laterality Date    CARPAL TUNNEL RELEASE Left     CATARACT EXTRACTION      GALLBLADDER SURGERY      HERNIA REPAIR      HYSTERECTOMY      PARS PLANA VITRECTOMY W/ REPAIR OF MACULAR HOLE      TOTAL KNEE ARTHROPLASTY Bilateral        Family History   Problem Relation Age of Onset    Cancer Mother     Diabetes Father     Heart disease Father        Social History     Socioeconomic History    Marital status:      Spouse name: None    Number of children: None    Years of education: None    Highest education level: None   Social Needs    Financial resource strain: None    Food insecurity - worry: None    Food insecurity - inability: None    Transportation needs - medical: None    Transportation needs - non-medical: None   Occupational History    None   Tobacco Use    Smoking status: Never Smoker    Smokeless tobacco: Never Used   Substance and Sexual Activity    Alcohol use: None    Drug use: None    Sexual activity: None   Other Topics Concern    None   Social History Narrative    None       Current Outpatient Medications on File Prior to Visit   Medication Sig Dispense Refill    acetaminophen (TYLENOL) 500 MG tablet Take 500 mg by mouth every 6 (six) hours as needed  for Pain.      aspirin (ECOTRIN) 81 MG EC tablet Take 81 mg by mouth once daily.      azelastine (ASTELIN) 137 mcg (0.1 %) nasal spray       calcium carbonate (OS-SAMSON) 600 mg (1,500 mg) Tab Take 600 mg by mouth 2 (two) times daily with meals.      lovastatin (MEVACOR) 40 MG tablet 40 mg nightly.   0    metoprolol succinate (TOPROL-XL) 50 MG 24 hr tablet TAKE 1 TABLET BY MOUTH EVERY DAY 90 tablet 2    nitroGLYCERIN (NITROSTAT) 0.4 MG SL tablet Place 1 tablet (0.4 mg total) under the tongue every 5 (five) minutes as needed for Chest pain. 25 tablet 12    spironolactone (ALDACTONE) 25 MG tablet TAKE 1 TABLET BY MOUTH EVERY DAY 90 tablet 2    torsemide (DEMADEX) 5 MG Tab Take 5 mg by mouth once daily.  0    [DISCONTINUED] ferrous sulfate 325 mg (65 mg iron) Tab tablet Take 325 mg by mouth daily with breakfast.      [DISCONTINUED] pantoprazole (PROTONIX) 40 MG tablet       [DISCONTINUED] torsemide (DEMADEX) 20 MG Tab Take 1 tablet (20 mg total) by mouth once daily. 90 tablet 3     No current facility-administered medications on file prior to visit.        Review of patient's allergies indicates:   Allergen Reactions    Lasix [furosemide] Other (See Comments)     pancreatitis    Bactrim [sulfamethoxazole-trimethoprim] Other (See Comments)     AIN    Endocaine     Noroxin [norfloxacin]     Tramadol     Tussin ac      Objective:     Vitals:    11/29/18 1128   BP: 125/66   BP Location: Left arm   Patient Position: Sitting   BP Method: Large (Automatic)   Pulse: 79   Weight: 106.1 kg (234 lb)   Height: 5' (1.524 m)        Physical Exam   Constitutional: She is oriented to person, place, and time. She appears well-developed and well-nourished.   Eyes: No scleral icterus.   Neck: No JVD present. Carotid bruit is not present.   Cardiovascular: Normal rate and regular rhythm. Exam reveals no gallop.   Murmur (Iv/VI systolic) heard.  Pulmonary/Chest: She has rales (few crackles).   Musculoskeletal: She exhibits  edema (edema of both LE, ACE wraps in place).   Neurological: She is alert and oriented to person, place, and time.   Skin: Skin is warm and dry.   Psychiatric: She has a normal mood and affect. Her behavior is normal. Judgment and thought content normal.   Vitals reviewed.     Records from [facility] reviewed    Echocardiogram last month showed normal LVEF and mild aortic stenosis    CXR showed an elevated hemidiaphragm and possibly a trace effusion    ECG:NSR with first degree AV block, otherwise WNL    ABG's showed marked hypoxemia and hypercapnea and metabolic alkalosis  Assessment:     1. Venous stasis ulcers of both lower extremities    2. Pulmonary hypertension    3. Chronic pulmonary heart disease    4. Essential hypertension    5. Mixed hyperlipidemia    6. Venous stasis dermatitis of both lower extremities    7. Aortic valve stenosis, etiology of cardiac valve disease unspecified    8. Chronic kidney disease, stage III (moderate)    9. Metabolic alkalosis      Plan:   Lily was seen today for hospital follow up.    Diagnoses and all orders for this visit:    Venous stasis ulcers of both lower extremities    Pulmonary hypertension    Chronic pulmonary heart disease    Essential hypertension    Mixed hyperlipidemia    Venous stasis dermatitis of both lower extremities    Aortic valve stenosis, etiology of cardiac valve disease unspecified    Chronic kidney disease, stage III (moderate)    Metabolic alkalosis    Other orders  -     IN OFFICE EKG 12-LEAD (to [place])     Suspect that metabolic alkalosis is compensatory for respiratory acidosis from chronic hypoventilation syndrome rather than dehydration from diuretics since BUN and creatinine were normal.    Corpulmonale appears compensated on lower dose of diuretics.     Continue local care of stasis dermatitis    Agree with current management    Diamox is a consideration for diuretic if it is felt that the diuretics are felt to be worsening the metabolic  alkalosis    Long discussion with patient and daughter regarding benefits of biPAP as prescribed by Dr Alatorre    Avoid all sedatives    RTC 2 months    F/u with Dr Lewis and Dr Espino and Dr Alatorre    OK to double the diuretics prn wt gain    RTC in February    No Follow-up on file.

## 2018-12-17 ENCOUNTER — TELEPHONE (OUTPATIENT)
Dept: CARDIOLOGY | Facility: CLINIC | Age: 83
End: 2018-12-17

## 2018-12-17 NOTE — TELEPHONE ENCOUNTER
----- Message from Chiquis Daily sent at 12/13/2018  9:21 AM CST -----  Contact: Geam (daughter)/ [phone]  Daughter asked to speak with you since patient gained 2 lbs in 1 day. Her blood pressure is 123/93. Patient thinks she has a lot fluid in her legs since they are swollen.    Please call.

## 2019-02-03 DIAGNOSIS — I10 ESSENTIAL HYPERTENSION: ICD-10-CM

## 2019-02-04 RX ORDER — METOPROLOL SUCCINATE 50 MG/1
TABLET, EXTENDED RELEASE ORAL
Qty: 90 TABLET | Refills: 2 | Status: SHIPPED | OUTPATIENT
Start: 2019-02-04